# Patient Record
Sex: MALE | Race: WHITE
[De-identification: names, ages, dates, MRNs, and addresses within clinical notes are randomized per-mention and may not be internally consistent; named-entity substitution may affect disease eponyms.]

---

## 2019-08-23 ENCOUNTER — HOSPITAL ENCOUNTER (OUTPATIENT)
Dept: HOSPITAL 19 - SDCO | Age: 69
LOS: 2 days | Discharge: HOME | End: 2019-08-25
Attending: UROLOGY
Payer: MEDICARE

## 2019-08-23 VITALS — BODY MASS INDEX: 22.47 KG/M2 | HEIGHT: 69.02 IN | WEIGHT: 151.68 LBS

## 2019-08-23 VITALS — HEART RATE: 61 BPM | DIASTOLIC BLOOD PRESSURE: 87 MMHG | SYSTOLIC BLOOD PRESSURE: 176 MMHG

## 2019-08-23 VITALS — DIASTOLIC BLOOD PRESSURE: 81 MMHG | SYSTOLIC BLOOD PRESSURE: 130 MMHG | HEART RATE: 59 BPM

## 2019-08-23 VITALS — DIASTOLIC BLOOD PRESSURE: 62 MMHG | HEART RATE: 71 BPM | SYSTOLIC BLOOD PRESSURE: 126 MMHG | TEMPERATURE: 98.1 F

## 2019-08-23 VITALS — SYSTOLIC BLOOD PRESSURE: 182 MMHG | DIASTOLIC BLOOD PRESSURE: 72 MMHG | HEART RATE: 52 BPM

## 2019-08-23 VITALS — HEART RATE: 50 BPM | SYSTOLIC BLOOD PRESSURE: 142 MMHG | DIASTOLIC BLOOD PRESSURE: 72 MMHG

## 2019-08-23 VITALS — SYSTOLIC BLOOD PRESSURE: 173 MMHG | DIASTOLIC BLOOD PRESSURE: 76 MMHG | HEART RATE: 57 BPM

## 2019-08-23 VITALS — SYSTOLIC BLOOD PRESSURE: 142 MMHG | HEART RATE: 51 BPM | DIASTOLIC BLOOD PRESSURE: 68 MMHG

## 2019-08-23 VITALS — HEART RATE: 73 BPM | SYSTOLIC BLOOD PRESSURE: 166 MMHG | DIASTOLIC BLOOD PRESSURE: 82 MMHG | TEMPERATURE: 98.1 F

## 2019-08-23 VITALS — SYSTOLIC BLOOD PRESSURE: 126 MMHG | HEART RATE: 65 BPM | DIASTOLIC BLOOD PRESSURE: 72 MMHG

## 2019-08-23 DIAGNOSIS — R35.0: ICD-10-CM

## 2019-08-23 DIAGNOSIS — Z98.52: ICD-10-CM

## 2019-08-23 DIAGNOSIS — R35.1: ICD-10-CM

## 2019-08-23 DIAGNOSIS — Z79.899: ICD-10-CM

## 2019-08-23 DIAGNOSIS — Z82.49: ICD-10-CM

## 2019-08-23 DIAGNOSIS — N41.1: ICD-10-CM

## 2019-08-23 DIAGNOSIS — Z79.82: ICD-10-CM

## 2019-08-23 DIAGNOSIS — I10: ICD-10-CM

## 2019-08-23 DIAGNOSIS — N13.2: Primary | ICD-10-CM

## 2019-08-23 PROCEDURE — A4314 CATH W/DRAINAGE 2-WAY LATEX: HCPCS

## 2019-08-23 PROCEDURE — C1894 INTRO/SHEATH, NON-LASER: HCPCS

## 2019-08-24 VITALS — DIASTOLIC BLOOD PRESSURE: 59 MMHG | TEMPERATURE: 97.5 F | SYSTOLIC BLOOD PRESSURE: 145 MMHG | HEART RATE: 51 BPM

## 2019-08-24 VITALS — TEMPERATURE: 98.6 F | SYSTOLIC BLOOD PRESSURE: 158 MMHG | DIASTOLIC BLOOD PRESSURE: 67 MMHG | HEART RATE: 69 BPM

## 2019-08-24 VITALS — HEART RATE: 64 BPM | DIASTOLIC BLOOD PRESSURE: 68 MMHG | TEMPERATURE: 98.3 F | SYSTOLIC BLOOD PRESSURE: 151 MMHG

## 2019-08-24 VITALS — SYSTOLIC BLOOD PRESSURE: 147 MMHG | TEMPERATURE: 97.4 F | DIASTOLIC BLOOD PRESSURE: 53 MMHG | HEART RATE: 71 BPM

## 2019-08-24 VITALS — SYSTOLIC BLOOD PRESSURE: 126 MMHG | HEART RATE: 58 BPM | DIASTOLIC BLOOD PRESSURE: 58 MMHG | TEMPERATURE: 97.8 F

## 2019-08-24 NOTE — NUR
Plan is to return home with wife Jess [phone].
Patient reports that he is wanting to stay in the hospital and deal with the
cath placement. Patient reports that he uses [facility] in [place] for RX and his PCP
is Dr. Mix. Patient reports that he has an upcoming annual on Wednesday.
Patient indicated that there are no other concerns at this time and wife will
transport home.
Will continue to follow if needs arise.

## 2019-08-24 NOTE — NUR
Dr Stinson has been in talking with patient about discharging and plan for
possible edmondson catheter placement. He wanted to give a dose of flomax now and
or a 30 day script of flomax 0.4mg daily called into patients pharmacy. That
has been completed. Discussed with patient the plan for attempting to void
after flomax and placing a catheter. Discussed that he has not voided now for
12 hours and the longer we wait that could cause more problems. Patient
verbalized understanding. No other changes at this time. Call light within
reach.

## 2019-08-24 NOTE — NUR
Patient noted to have 280ml in bladder after bladder scan. Patient states he
feels the urge to urinate, but can't at this time. Patient and wife walking
the halls currently.

## 2019-08-24 NOTE — NUR
Edmondson catheter placed at 1600. Patient was voiding more and was attempting to
empty bladder without catheter being placed. Bladder scanned patient and it
showed greater than 999ml. Explained this to patient. He finally agreed to lay
down long enough to try the catheter placement. 18fr coude tip catheter
placed. Dark brown/orange tinted urine returned. No clots noted. Patient is
staying one more night. Explained that even though he is staying he will
discharge with edmondson catheter in place. Patient verbalized understanding. No
other changes a this time. Call light within reach.

## 2019-08-24 NOTE — NUR
Attempted to place edmondson catheter. Was not able to get catheter trough the
prostate. Patient had blood returned when attempted to place edmondson, in the
catheter and around. Notified Dr Stinson and a dose of ativan was ordered
and lidocain jelly uroject. Explained the plan to the patient to give the
ativan and hope it will help with relaxing the prostate and allow they
catheter through. Patient verbalized understanding. No other changes at this
time. Call light within reach.

## 2019-08-24 NOTE — NUR
Pt. sitting up in bed at this time.  Pt. is A&OX3, assessment complete.  INT
to rt. hand patent. Pt. denies pain or other needs, call light within reach.

## 2019-08-24 NOTE — NUR
Report received from Nhi in PACU. Patient to the unit, ambulatory. BP:
182/75. Notified physician and order received for 10mg Hydralazine IV and 20mg
Lasix IV now and if hydralazine was ineffective to call anesthesia. BP
decreased to normal limits. Patient's pain noted to be 4/10. One Percocet
given and not effective. Patient then rated pain 7/10. 2nd Percocet given.
Noted to not be effective. Patient was having a hard time urinating. 280 total
out. Ellen notified and Tordal 60mg IM now and pyridium 100mg q8hr PRN. Also
ordered Tordal 30mg IM q8hr PRN after first dose. 60mg Tordal and pyridium
given. Patient stated a little bit of relief. Bladder scan showed 700ml of
urine in bladder. Order received to straight cath with uroject and to keep
patient overnight. Straight cath completed and patient tolerated this okay,
but did have a lot of pain and some resistance was noted during cath. 900ml
emptied from bladder at 2330 from cath. Patient stated his pain was completely
gone. Patient has not voided since cath, and has been sleeping. Wife at
bedside.

## 2019-08-25 VITALS — DIASTOLIC BLOOD PRESSURE: 69 MMHG | SYSTOLIC BLOOD PRESSURE: 138 MMHG | TEMPERATURE: 98.1 F | HEART RATE: 60 BPM

## 2019-08-25 VITALS — DIASTOLIC BLOOD PRESSURE: 74 MMHG | TEMPERATURE: 97.9 F | HEART RATE: 86 BPM | SYSTOLIC BLOOD PRESSURE: 151 MMHG

## 2019-08-25 VITALS — TEMPERATURE: 98.1 F | DIASTOLIC BLOOD PRESSURE: 72 MMHG | SYSTOLIC BLOOD PRESSURE: 136 MMHG | HEART RATE: 64 BPM

## 2019-08-25 VITALS — HEART RATE: 68 BPM | SYSTOLIC BLOOD PRESSURE: 144 MMHG | DIASTOLIC BLOOD PRESSURE: 72 MMHG | TEMPERATURE: 98.3 F

## 2019-08-25 NOTE — NUR
Patient is discharging home. Discharge instructions discussed with patient,
his wife and his daughter. Explained how to discontinue the edmondson catheter.
Explained hwo to use th leg bag and how to empty both the leg bag and large
edmondson bag. Patient and wife verbalized understanding. Patients daughter is a
nurse and stated she will help as needed once he is home. Copies of discharge
isntructions given to patient. All belongings packed up and sent home with
patient. Patient walked out with Latia EVANS.

## 2019-08-25 NOTE — NUR
Patient has been walking in the hallways independently. Denies pain and
nausea. Urine is getting lighter with less sediment. Reminded patient to drink
fluids. Patient had several questions about the discharge plan, answered the
ones I could. He also stated he would like to speak with the doctor before
discharging. No other changes at this time. Call light within reach.